# Patient Record
Sex: FEMALE | Race: BLACK OR AFRICAN AMERICAN | NOT HISPANIC OR LATINO | Employment: UNEMPLOYED | URBAN - METROPOLITAN AREA
[De-identification: names, ages, dates, MRNs, and addresses within clinical notes are randomized per-mention and may not be internally consistent; named-entity substitution may affect disease eponyms.]

---

## 2022-10-21 ENCOUNTER — HOSPITAL ENCOUNTER (EMERGENCY)
Facility: HOSPITAL | Age: 37
Discharge: HOME | End: 2022-10-21
Attending: EMERGENCY MEDICINE

## 2022-10-21 VITALS
TEMPERATURE: 97.8 F | HEART RATE: 104 BPM | WEIGHT: 293 LBS | DIASTOLIC BLOOD PRESSURE: 97 MMHG | RESPIRATION RATE: 20 BRPM | OXYGEN SATURATION: 99 % | SYSTOLIC BLOOD PRESSURE: 173 MMHG

## 2022-10-21 DIAGNOSIS — R04.0 LEFT-SIDED EPISTAXIS: Primary | ICD-10-CM

## 2022-10-21 PROCEDURE — 99283 EMERGENCY DEPT VISIT LOW MDM: CPT

## 2022-10-21 NOTE — ED PROVIDER NOTES
History  Chief Complaint   Patient presents with   • Nose Bleed     Reports " after blowing my nose , the left side started bleeding "     71-year-old female presents for evaluation of left-sided nosebleed which she reports occurred prior to arrival in the ER states that she was blowing her nose and saw blood on a tissue paper  Patient reports she continued to have bleeding from her left nostril despite pinching her nose  Patient arrives to the emergency department reporting “I was into my nose so hard it hurt but now I think it stopped”  Patient denies any bleeding or clotting disorders in states she is not taking any blood thinning medications  Patient reports he has been congested for the past few days  No other complaints          None       Past Medical History:   Diagnosis Date   • Hypertension        No past surgical history on file  No family history on file  I have reviewed and agree with the history as documented  E-Cigarette/Vaping     E-Cigarette/Vaping Substances     Social History     Tobacco Use   • Smoking status: Never Smoker   • Smokeless tobacco: Never Used   Substance Use Topics   • Alcohol use: Yes   • Drug use: Not Currently       Review of Systems   Constitutional: Negative for chills, fatigue and fever  HENT: Positive for congestion and nosebleeds  Negative for ear pain, rhinorrhea and sore throat  Eyes: Negative for redness  Respiratory: Negative for chest tightness and shortness of breath  Cardiovascular: Negative for chest pain and palpitations  Gastrointestinal: Negative for abdominal pain, nausea and vomiting  Genitourinary: Negative for dysuria and hematuria  Musculoskeletal: Negative  Skin: Negative for rash  Neurological: Negative for dizziness, syncope, light-headedness and numbness  Physical Exam  Physical Exam  Vitals and nursing note reviewed  Constitutional:       Appearance: She is well-developed  HENT:      Head: Normocephalic  Nose:        Comments: Posterior oropharynx is clear no bleeding is appreciated  Eyes:      General: No scleral icterus  Cardiovascular:      Rate and Rhythm: Normal rate and regular rhythm  Pulmonary:      Effort: Pulmonary effort is normal       Breath sounds: Normal breath sounds  No stridor  Abdominal:      General: There is no distension  Palpations: Abdomen is soft  Tenderness: There is no abdominal tenderness  Musculoskeletal:         General: Normal range of motion  Skin:     General: Skin is warm and dry  Capillary Refill: Capillary refill takes less than 2 seconds  Neurological:      Mental Status: She is alert and oriented to person, place, and time  Vital Signs  ED Triage Vitals [10/21/22 1752]   Temperature Pulse Respirations Blood Pressure SpO2   97 8 °F (36 6 °C) 104 20 (!) 173/97 99 %      Temp Source Heart Rate Source Patient Position - Orthostatic VS BP Location FiO2 (%)   Tympanic Monitor Sitting Left arm --      Pain Score       --           Vitals:    10/21/22 1752   BP: (!) 173/97   Pulse: 104   Patient Position - Orthostatic VS: Sitting         Visual Acuity      ED Medications  Medications - No data to display    Diagnostic Studies  Results Reviewed     None                 No orders to display              Procedures  Procedures         ED Course                                             MDM  Number of Diagnoses or Management Options  Left-sided epistaxis  Diagnosis management comments: All imaging and/or lab testing discussed with patient patient advised to stay hydrated and avoid blow her nose and use a saline nasal spray over the next few days to alleviate congetsion, strict return to ED precautions discussed  Patient recommended to follow up promptly with appropriate outpatient provider  Patient and/or family members verbalizes understanding and agrees with plan   Patient is stable for discharge      Portions of the record may have been created with voice recognition software  Occasional wrong word or "sound a like" substitutions may have occurred due to the inherent limitations of voice recognition software  Read the chart carefully and recognize, using context, where substitutions have occurred  Disposition  Final diagnoses:   Left-sided epistaxis     Time reflects when diagnosis was documented in both MDM as applicable and the Disposition within this note     Time User Action Codes Description Comment    10/21/2022  6:03 PM Sumi Kuhn Add [R04 0] Left-sided epistaxis       ED Disposition     ED Disposition   Discharge    Condition   Good    Date/Time   Fri Oct 21, 2022  6:04 PM    Comment   Hansel Mediate discharge to home/self care  Follow-up Information     Follow up With Specialties Details Why Contact Info    Gretchen Reyes MD Family Medicine Schedule an appointment as soon as possible for a visit  As needed [address]  [phone]            Patient's Medications   Discharge Prescriptions    SODIUM CHLORIDE (OCEAN) 0 65 % NASAL SPRAY    1 spray into each nostril as needed for congestion (as needed for congestion)       Start Date: 10/21/2022End Date: 10/21/2023       Order Dose: 1 spray       Quantity: 15 mL    Refills: 0       No discharge procedures on file      PDMP Review     None          ED Provider  Electronically Signed by           Debby Mckenzie PA-C  10/21/22 9829